# Patient Record
Sex: FEMALE | Race: BLACK OR AFRICAN AMERICAN | NOT HISPANIC OR LATINO | Employment: FULL TIME | URBAN - METROPOLITAN AREA
[De-identification: names, ages, dates, MRNs, and addresses within clinical notes are randomized per-mention and may not be internally consistent; named-entity substitution may affect disease eponyms.]

---

## 2023-12-21 ENCOUNTER — HOSPITAL ENCOUNTER (EMERGENCY)
Facility: OTHER | Age: 58
Discharge: HOME | End: 2023-12-21
Attending: EMERGENCY MEDICINE
Payer: COMMERCIAL

## 2023-12-21 VITALS
OXYGEN SATURATION: 97 % | WEIGHT: 155 LBS | HEART RATE: 89 BPM | BODY MASS INDEX: 31.25 KG/M2 | TEMPERATURE: 98 F | HEIGHT: 59 IN | DIASTOLIC BLOOD PRESSURE: 84 MMHG | SYSTOLIC BLOOD PRESSURE: 136 MMHG | RESPIRATION RATE: 18 BRPM

## 2023-12-21 DIAGNOSIS — S09.90XA INJURY OF HEAD, INITIAL ENCOUNTER: Primary | ICD-10-CM

## 2023-12-21 PROCEDURE — 99285 EMERGENCY DEPT VISIT HI MDM: CPT | Mod: 25

## 2023-12-21 PROCEDURE — 90715 TDAP VACCINE 7 YRS/> IM: CPT | Performed by: EMERGENCY MEDICINE

## 2023-12-21 PROCEDURE — 63600175 PHARM REV CODE 636 W HCPCS: Performed by: EMERGENCY MEDICINE

## 2023-12-21 PROCEDURE — 25000003 PHARM REV CODE 250: Performed by: EMERGENCY MEDICINE

## 2023-12-21 PROCEDURE — 90471 IMMUNIZATION ADMIN: CPT | Performed by: EMERGENCY MEDICINE

## 2023-12-21 RX ORDER — IBUPROFEN 400 MG/1
800 TABLET ORAL
Status: COMPLETED | OUTPATIENT
Start: 2023-12-21 | End: 2023-12-21

## 2023-12-21 RX ORDER — METHOCARBAMOL 500 MG/1
1000 TABLET, FILM COATED ORAL 3 TIMES DAILY PRN
Qty: 20 TABLET | Refills: 0 | Status: SHIPPED | OUTPATIENT
Start: 2023-12-21 | End: 2023-12-26

## 2023-12-21 RX ORDER — IBUPROFEN 600 MG/1
600 TABLET ORAL EVERY 6 HOURS PRN
Qty: 20 TABLET | Refills: 0 | Status: SHIPPED | OUTPATIENT
Start: 2023-12-21

## 2023-12-21 RX ADMIN — TETANUS TOXOID, REDUCED DIPHTHERIA TOXOID AND ACELLULAR PERTUSSIS VACCINE, ADSORBED 0.5 ML: 5; 2.5; 8; 8; 2.5 SUSPENSION INTRAMUSCULAR at 04:12

## 2023-12-21 RX ADMIN — IBUPROFEN 800 MG: 400 TABLET ORAL at 04:12

## 2023-12-21 NOTE — FIRST PROVIDER EVALUATION
" Emergency Department TeleTriage Encounter Note      CHIEF COMPLAINT    Chief Complaint   Patient presents with    Fall     Pt reports tripped while walking outside and fell head first. Reports passed out "for a second." Abrasion and knot noted to left forehead. Denies blood thinner use.        VITAL SIGNS   Initial Vitals [12/21/23 1520]   BP Pulse Resp Temp SpO2   139/84 92 18 97.9 °F (36.6 °C) 96 %      MAP       --            ALLERGIES    Review of patient's allergies indicates:  No Known Allergies    PROVIDER TRIAGE NOTE  Verbal consent for the teletriage evaluation was given by the patient at the start of the evaluation.  All efforts will be made to maintain patient's privacy during the evaluation.      This is a teletriage evaluation of a 58 y.o. female presenting to the ED with c/o trip and fell, hit head and face on concrete.  Possible LOC.  Abrasion noted above left eyebrow.  Denies neck pain. Limited physical exam via telehealth: The patient is awake, alert, answering questions appropriately and is not in respiratory distress.  As the Teletriage provider, I performed an initial assessment and ordered appropriate labs and imaging studies, if any, to facilitate the patient's care once placed in the ED. Once a room is available, care and a full evaluation will be completed by an alternate ED provider.  Any additional orders and the final disposition will be determined by that provider.  All imaging and labs will not be followed-up by the Teletriage Team, including myself.          ORDERS  Labs Reviewed - No data to display    ED Orders (720h ago, onward)      Start Ordered     Status Ordering Provider    12/21/23 1525 12/21/23 1525  CT Head Without Contrast  1 time imaging         Ordered ALBA LAWSON    12/21/23 1525 12/21/23 1525  CT Maxillofacial Without Contrast  1 time imaging         Ordered ALBA LAWSON              Virtual Visit Note: The provider triage portion of this emergency department " evaluation and documentation was performed via [employer], a HIPAA-compliant telemedicine application, in concert with a tele-presenter in the room. A face to face patient evaluation with one of my colleagues will occur once the patient is placed in an emergency department room.      DISCLAIMER: This note was prepared with [employer] voice recognition transcription software. Garbled syntax, mangled pronouns, and other bizarre constructions may be attributed to that software system.

## 2023-12-21 NOTE — ED PROVIDER NOTES
"Encounter Date: 12/21/2023       History     Chief Complaint   Patient presents with    Fall     Pt reports tripped while walking outside and fell head first. Reports passed out "for a second." Abrasion and knot noted to left forehead. Denies blood thinner use.      Seen by physician at 3:50PM:      Patient is a 58 female who presents to the emergency department after a fall that occurred approximately 1-2 hours ago.  She was walking outside when she tripped and fell forwards on the concrete.  She denies any LOC.  She did have a resultant head wound with bleeding.  She also has some resultant neck pain but denies any back pain.  Denies any chest pain or shortness breath.  Denies any dizziness before or after the fall.  She denies blood thinner use.  She does not know her last tetanus.      Review of patient's allergies indicates:  No Known Allergies  No past medical history on file.  No past surgical history on file.  No family history on file.     Review of Systems   Constitutional:  Negative for chills and fever.   HENT:  Negative for congestion and rhinorrhea.    Respiratory:  Negative for chest tightness and shortness of breath.    Cardiovascular:  Negative for chest pain and palpitations.   Gastrointestinal:  Negative for abdominal pain, diarrhea, nausea and vomiting.   Genitourinary:  Negative for dysuria and flank pain.   Musculoskeletal:  Positive for neck pain. Negative for back pain.   Skin:  Positive for wound. Negative for color change.   Neurological:  Negative for dizziness and headaches.       Physical Exam     Initial Vitals [12/21/23 1520]   BP Pulse Resp Temp SpO2   139/84 92 18 97.9 °F (36.6 °C) 96 %      MAP       --         Physical Exam    Nursing note and vitals reviewed.  Constitutional: She appears well-developed and well-nourished.   HENT:   Head: Normocephalic.    2 small abrasions with resultant hematoma to the left forehead   Eyes: Conjunctivae are normal.   Neck: Neck supple.   Normal " range of motion.  Cardiovascular:  Normal rate, regular rhythm and normal heart sounds.           Pulmonary/Chest: Breath sounds normal. No respiratory distress. She has no wheezes. She has no rales.   Abdominal: She exhibits no distension.   Musculoskeletal:         General: Normal range of motion.      Cervical back: Normal range of motion and neck supple.      Comments:  No CTL midline tenderness.     Neurological: She is alert and oriented to person, place, and time.     Ambulatory with steady gait   Skin: Skin is warm and dry. Capillary refill takes less than 2 seconds.         ED Course   Procedures  Labs Reviewed - No data to display       Imaging Results              CT Maxillofacial Without Contrast (Final result)  Result time 12/21/23 16:24:29      Final result by Suleiman Telles III, MD (12/21/23 16:24:29)                   Impression:      No acute process seen.      Electronically signed by: Suleiman Telles MD  Date:    12/21/2023  Time:    16:24               Narrative:    EXAMINATION:  CT MAXILLOFACIAL WITHOUT CONTRAST    CLINICAL HISTORY:  Facial trauma, blunt;    FINDINGS:  The mandible is intact.  Pterygoid plates are intact.  Zygomatic arches are unremarkable.  Nasal bones and nasal spines are intact.  The orbits are intact.  No soft tissue mass or adenopathy seen.  Mastoid air cells and middle ear cavities are clear.  No C-spine trauma seen.                                       CT Head Without Contrast (Final result)  Result time 12/21/23 16:23:19      Final result by Suleiman Telles III, MD (12/21/23 16:23:19)                   Impression:      No acute intracranial process seen.      Electronically signed by: Suleiman Telles MD  Date:    12/21/2023  Time:    16:23               Narrative:    EXAMINATION:  CT HEAD WITHOUT CONTRAST    CLINICAL HISTORY:  Head trauma, moderate-severe;Facial trauma, blunt;possible LOC;    FINDINGS:  No bleed, mass, or mass effect seen.  No trauma is seen.  No  blood products are seen.  No edema is seen.  No skull lesion or skull fracture seen.  There is soft tissue edema left temporal region.                                       Medications   Tdap (BOOSTRIX) vaccine injection 0.5 mL (0.5 mLs Intramuscular Given 12/21/23 1617)   ibuprofen tablet 800 mg (800 mg Oral Given 12/21/23 1616)     Medical Decision Making  3:50PM:     Patient is a 58-year-old female who presents to the emergency room after a fall.  She does have a superficial bleeding wound to the left forehead with a resultant hematoma.  She is otherwise neurologically intact. Will plan for imaging, irrigation, tetanus, will continue to follow and reassess.    Amount and/or Complexity of Data Reviewed  External Data Reviewed: notes.  Radiology: ordered. Decision-making details documented in ED Course.    Risk  Prescription drug management.    5:36 PM:    Patient's CT is negative for any acute findings.  Wound irrigated and I do not feel that any repair is indicated at this time.  Will plan for conservative management with wound care at home.Will plan to prescribe a course of NSAIDs and muscle relaxants to take as needed for pain.  I do not feel that further work up in the ED is indicated at this time.  I updated pt regarding results and I counseled pt regarding supportive care measures.  I have discussed with the pt ED return warnings and need for close PCP f/u.  Pt agreeable to plan and all questions answered.  I feel that pt is stable for discharge and management as an outpatient and no further intervention is needed at this time.  Pt is comfortable returning to the ED if needed.  Will DC home in stable condition.                                    Clinical Impression:  Final diagnoses:  [S09.90XA] Injury of head, initial encounter (Primary)          ED Disposition Condition    Discharge Stable          ED Prescriptions       Medication Sig Dispense Start Date End Date Auth. Provider    ibuprofen (ADVIL,MOTRIN)  600 MG tablet Take 1 tablet (600 mg total) by mouth every 6 (six) hours as needed for Pain. 20 tablet 12/21/2023 -- Sophia Lott MD    methocarbamoL (ROBAXIN) 500 MG Tab Take 2 tablets (1,000 mg total) by mouth 3 (three) times daily as needed. 20 tablet 12/21/2023 12/26/2023 Sophia Lott MD          Follow-up Information       Follow up With Specialties Details Why Contact Info    Primary care Physician                 Sophia Lott MD  12/21/23 [id number]       Sophia Lott MD  12/21/23 [id number]

## 2023-12-21 NOTE — ED TRIAGE NOTES
Tripped on uneven sidewalk just PTA - presents with c/o hematoma/abrasion to left forehead and pain to left knee. Able to bear weight. No distress noted.

## 2024-07-12 ENCOUNTER — HOSPITAL ENCOUNTER (EMERGENCY)
Facility: OTHER | Age: 59
Discharge: HOME | End: 2024-07-12
Attending: EMERGENCY MEDICINE
Payer: COMMERCIAL

## 2024-07-12 VITALS
RESPIRATION RATE: 15 BRPM | TEMPERATURE: 98 F | SYSTOLIC BLOOD PRESSURE: 117 MMHG | HEIGHT: 59 IN | BODY MASS INDEX: 31.31 KG/M2 | OXYGEN SATURATION: 95 % | DIASTOLIC BLOOD PRESSURE: 66 MMHG | HEART RATE: 75 BPM

## 2024-07-12 DIAGNOSIS — M54.50 ACUTE RIGHT-SIDED LOW BACK PAIN WITHOUT SCIATICA: ICD-10-CM

## 2024-07-12 DIAGNOSIS — R07.89 CHEST TIGHTNESS: ICD-10-CM

## 2024-07-12 DIAGNOSIS — Z87.19 HISTORY OF VENTRAL HERNIA REPAIR: ICD-10-CM

## 2024-07-12 DIAGNOSIS — R10.31 RIGHT LOWER QUADRANT ABDOMINAL PAIN: Primary | ICD-10-CM

## 2024-07-12 DIAGNOSIS — R07.9 CHEST PAIN: ICD-10-CM

## 2024-07-12 DIAGNOSIS — Z98.890 HISTORY OF VENTRAL HERNIA REPAIR: ICD-10-CM

## 2024-07-12 LAB
ALBUMIN SERPL BCP-MCNC: 3.3 G/DL (ref 3.5–5.2)
ALP SERPL-CCNC: 118 U/L (ref 55–135)
ALT SERPL W/O P-5'-P-CCNC: 14 U/L (ref 10–44)
ANION GAP SERPL CALC-SCNC: 7 MMOL/L (ref 8–16)
AST SERPL-CCNC: 14 U/L (ref 10–40)
BACTERIA #/AREA URNS HPF: NORMAL /HPF
BASOPHILS # BLD AUTO: 0.03 K/UL (ref 0–0.2)
BASOPHILS NFR BLD: 0.3 % (ref 0–1.9)
BILIRUB SERPL-MCNC: 0.7 MG/DL (ref 0.1–1)
BILIRUB UR QL STRIP: NEGATIVE
BUN SERPL-MCNC: 15 MG/DL (ref 8–23)
CALCIUM SERPL-MCNC: 9 MG/DL (ref 8.7–10.5)
CHLORIDE SERPL-SCNC: 108 MMOL/L (ref 95–110)
CLARITY UR: CLEAR
CO2 SERPL-SCNC: 26 MMOL/L (ref 23–29)
COLOR UR: YELLOW
CREAT SERPL-MCNC: 0.8 MG/DL (ref 0.5–1.4)
DIFFERENTIAL METHOD BLD: ABNORMAL
EOSINOPHIL # BLD AUTO: 0.1 K/UL (ref 0–0.5)
EOSINOPHIL NFR BLD: 1.3 % (ref 0–8)
ERYTHROCYTE [DISTWIDTH] IN BLOOD BY AUTOMATED COUNT: 15.2 % (ref 11.5–14.5)
EST. GFR  (NO RACE VARIABLE): >60 ML/MIN/1.73 M^2
GLUCOSE SERPL-MCNC: 92 MG/DL (ref 70–110)
GLUCOSE UR QL STRIP: NEGATIVE
HCT VFR BLD AUTO: 36.7 % (ref 37–48.5)
HGB BLD-MCNC: 11.7 G/DL (ref 12–16)
HGB UR QL STRIP: NEGATIVE
HYALINE CASTS #/AREA URNS LPF: 0 /LPF
IMM GRANULOCYTES # BLD AUTO: 0.02 K/UL (ref 0–0.04)
IMM GRANULOCYTES NFR BLD AUTO: 0.2 % (ref 0–0.5)
KETONES UR QL STRIP: NEGATIVE
LEUKOCYTE ESTERASE UR QL STRIP: NEGATIVE
LIPASE SERPL-CCNC: 7 U/L (ref 4–60)
LYMPHOCYTES # BLD AUTO: 1.5 K/UL (ref 1–4.8)
LYMPHOCYTES NFR BLD: 16.4 % (ref 18–48)
MCH RBC QN AUTO: 29 PG (ref 27–31)
MCHC RBC AUTO-ENTMCNC: 31.9 G/DL (ref 32–36)
MCV RBC AUTO: 91 FL (ref 82–98)
MICROSCOPIC COMMENT: NORMAL
MONOCYTES # BLD AUTO: 0.6 K/UL (ref 0.3–1)
MONOCYTES NFR BLD: 6.6 % (ref 4–15)
NEUTROPHILS # BLD AUTO: 6.8 K/UL (ref 1.8–7.7)
NEUTROPHILS NFR BLD: 75.2 % (ref 38–73)
NITRITE UR QL STRIP: NEGATIVE
NRBC BLD-RTO: 0 /100 WBC
OHS QRS DURATION: 70 MS
OHS QTC CALCULATION: 428 MS
PH UR STRIP: 8 [PH] (ref 5–8)
PLATELET # BLD AUTO: 228 K/UL (ref 150–450)
PMV BLD AUTO: 10.6 FL (ref 9.2–12.9)
POTASSIUM SERPL-SCNC: 4.1 MMOL/L (ref 3.5–5.1)
PROT SERPL-MCNC: 6.9 G/DL (ref 6–8.4)
PROT UR QL STRIP: ABNORMAL
RBC # BLD AUTO: 4.03 M/UL (ref 4–5.4)
RBC #/AREA URNS HPF: 3 /HPF (ref 0–4)
SODIUM SERPL-SCNC: 141 MMOL/L (ref 136–145)
SP GR UR STRIP: >1.03 (ref 1–1.03)
SQUAMOUS #/AREA URNS HPF: 2 /HPF
TROPONIN I SERPL DL<=0.01 NG/ML-MCNC: 0.01 NG/ML (ref 0–0.03)
URN SPEC COLLECT METH UR: ABNORMAL
UROBILINOGEN UR STRIP-ACNC: NEGATIVE EU/DL
WBC # BLD AUTO: 9 K/UL (ref 3.9–12.7)
WBC #/AREA URNS HPF: 1 /HPF (ref 0–5)

## 2024-07-12 PROCEDURE — 84484 ASSAY OF TROPONIN QUANT: CPT | Performed by: EMERGENCY MEDICINE

## 2024-07-12 PROCEDURE — 63600175 PHARM REV CODE 636 W HCPCS: Performed by: EMERGENCY MEDICINE

## 2024-07-12 PROCEDURE — 96376 TX/PRO/DX INJ SAME DRUG ADON: CPT

## 2024-07-12 PROCEDURE — 80053 COMPREHEN METABOLIC PANEL: CPT | Performed by: EMERGENCY MEDICINE

## 2024-07-12 PROCEDURE — 93005 ELECTROCARDIOGRAM TRACING: CPT

## 2024-07-12 PROCEDURE — 96374 THER/PROPH/DIAG INJ IV PUSH: CPT

## 2024-07-12 PROCEDURE — 85025 COMPLETE CBC W/AUTO DIFF WBC: CPT | Performed by: EMERGENCY MEDICINE

## 2024-07-12 PROCEDURE — 83690 ASSAY OF LIPASE: CPT | Performed by: EMERGENCY MEDICINE

## 2024-07-12 PROCEDURE — 96375 TX/PRO/DX INJ NEW DRUG ADDON: CPT

## 2024-07-12 PROCEDURE — 81000 URINALYSIS NONAUTO W/SCOPE: CPT | Performed by: EMERGENCY MEDICINE

## 2024-07-12 PROCEDURE — 25500020 PHARM REV CODE 255: Performed by: EMERGENCY MEDICINE

## 2024-07-12 PROCEDURE — 99285 EMERGENCY DEPT VISIT HI MDM: CPT | Mod: 25

## 2024-07-12 PROCEDURE — 93010 ELECTROCARDIOGRAM REPORT: CPT | Mod: ,,, | Performed by: INTERNAL MEDICINE

## 2024-07-12 RX ORDER — ONDANSETRON HYDROCHLORIDE 2 MG/ML
8 INJECTION, SOLUTION INTRAVENOUS ONCE AS NEEDED
Status: COMPLETED | OUTPATIENT
Start: 2024-07-12 | End: 2024-07-12

## 2024-07-12 RX ORDER — METOCLOPRAMIDE HYDROCHLORIDE 5 MG/ML
10 INJECTION INTRAMUSCULAR; INTRAVENOUS ONCE AS NEEDED
Status: COMPLETED | OUTPATIENT
Start: 2024-07-12 | End: 2024-07-12

## 2024-07-12 RX ORDER — TIZANIDINE 4 MG/1
4 TABLET ORAL EVERY 8 HOURS PRN
Qty: 15 TABLET | Refills: 0 | Status: SHIPPED | OUTPATIENT
Start: 2024-07-12 | End: 2024-07-22

## 2024-07-12 RX ORDER — OXYCODONE AND ACETAMINOPHEN 5; 325 MG/1; MG/1
1 TABLET ORAL EVERY 6 HOURS PRN
Qty: 12 TABLET | Refills: 0 | Status: SHIPPED | OUTPATIENT
Start: 2024-07-12

## 2024-07-12 RX ORDER — DROPERIDOL 2.5 MG/ML
0.62 INJECTION, SOLUTION INTRAMUSCULAR; INTRAVENOUS
Status: COMPLETED | OUTPATIENT
Start: 2024-07-12 | End: 2024-07-12

## 2024-07-12 RX ORDER — IBUPROFEN 600 MG/1
600 TABLET ORAL EVERY 8 HOURS PRN
Qty: 20 TABLET | Refills: 0 | Status: SHIPPED | OUTPATIENT
Start: 2024-07-12

## 2024-07-12 RX ORDER — KETOROLAC TROMETHAMINE 30 MG/ML
10 INJECTION, SOLUTION INTRAMUSCULAR; INTRAVENOUS
Status: COMPLETED | OUTPATIENT
Start: 2024-07-12 | End: 2024-07-12

## 2024-07-12 RX ORDER — HYDROMORPHONE HYDROCHLORIDE 1 MG/ML
0.5 INJECTION, SOLUTION INTRAMUSCULAR; INTRAVENOUS; SUBCUTANEOUS EVERY 30 MIN PRN
Status: DISCONTINUED | OUTPATIENT
Start: 2024-07-12 | End: 2024-07-12 | Stop reason: HOSPADM

## 2024-07-12 RX ADMIN — METOCLOPRAMIDE 10 MG: 5 INJECTION, SOLUTION INTRAMUSCULAR; INTRAVENOUS at 08:07

## 2024-07-12 RX ADMIN — KETOROLAC TROMETHAMINE 10 MG: 30 INJECTION, SOLUTION INTRAMUSCULAR; INTRAVENOUS at 09:07

## 2024-07-12 RX ADMIN — HYDROMORPHONE HYDROCHLORIDE 0.5 MG: 0.5 INJECTION, SOLUTION INTRAMUSCULAR; INTRAVENOUS; SUBCUTANEOUS at 06:07

## 2024-07-12 RX ADMIN — IOHEXOL 75 ML: 350 INJECTION, SOLUTION INTRAVENOUS at 06:07

## 2024-07-12 RX ADMIN — HYDROMORPHONE HYDROCHLORIDE 0.5 MG: 0.5 INJECTION, SOLUTION INTRAMUSCULAR; INTRAVENOUS; SUBCUTANEOUS at 08:07

## 2024-07-12 RX ADMIN — ONDANSETRON 8 MG: 2 INJECTION INTRAMUSCULAR; INTRAVENOUS at 06:07

## 2024-07-12 RX ADMIN — DROPERIDOL 0.62 MG: 2.5 INJECTION, SOLUTION INTRAMUSCULAR; INTRAVENOUS at 09:07

## 2024-07-12 NOTE — DISCHARGE INSTRUCTIONS
Take Zanaflex cautiously only as needed for muscle spasms, as it may cause sedation.  Do not take Percocet and Zanaflex at the same time due to concern for excessive sedation.

## 2024-07-12 NOTE — ED NOTES
Pt ambulated to the bathroom to and maintained a slow and steady gait. Pt instructed on how to provide a urine sample

## 2024-07-12 NOTE — ED PROVIDER NOTES
"  Source of History:  Medical record, patient      Chief complaint:  Per triage note: "Back Pain (Right signed back pain that radiates down the back of her leg./Rates pain 10/10 with difficult ambulation/Tylenol did not help)  "    HPI:    Patient presents severe right lower quadrant abdominal pain. Patient localizes the pain to her groin area and notes it radiates down to her knee as well as her back.  She states that she has had some intermittent pain in this area for the past year that she attributes to prior hernia surgery.  She states that she had acute severe pain tonight starting at approximately 2200 hours not improved with acetaminophen. Patient denies fever, dysuria, hematuria, or vaginal bleeding.     ROS:   See HPI for pertinent Review of Systems      Review of patient's allergies indicates:  No Known Allergies    PMH:  As per HPI and below:  History reviewed. No pertinent past medical history.    No past surgical history on file.         Physical Exam:      Nursing note and vitals reviewed.  /74 (BP Location: Left arm)   Pulse 80   Temp 98.3 °F (36.8 °C) (Oral)   Resp 18   Ht 4' 11" (1.499 m)   SpO2 97%   Breastfeeding No   BMI 31.31 kg/m²     Constitutional: No distress.  Eyes: EOMI. No discharge. Anicteric.  HENT:   Neck: Normal range of motion. Neck supple.  Cardiovascular: Normal rate. No murmur, no gallop and no friction rub heard.   Pulmonary/Chest: No respiratory distress. Effort normal. No wheezes, no rales, no rhonchi.   Abdominal:  Healed anterior abdominal surgical scar.  Bowel sounds normal. Soft. No distension and no mass. There is right lower quadrant tenderness. There is no rebound, no guarding  Neurological: GCS 15. Alert and oriented to person, place, and time. No gross cranial nerve, light touch or strength deficit. Coordination normal.   Skin: Skin is warm and dry.   EXT: 2+ radial pulses.  Right hip and knee range of motion exacerbate her pain.  Psych: Perseverative ("it " "starts here [points to RLQ] and it goes here" [motions across groin, to over hip joint, down lateral thigh towards knee])    Medical Decision Making / Independent Interpretations / External Records Reviewed:        ED Course as of 07/12/24 0640   Fri Jul 12, 2024   0542 Patient is a 59-year-old female with HIV, history of ventral hernia repair 2021 who presents for evaluation of right lower quadrant abdominal pain radiating to her right knee worse with hip flexion, knee movement, touching the area.  She denies any trauma, heavy lifting, or unusual activity.  She notes some nausea, denies any vomiting, or diarrhea.  She denies any associated fevers.  On exam, patient has diffuse tenderness over the right lower quadrant tenderness, with right hip and knee range of motion exacerbating her pain.  She has no obvious mass.  The initial differential included bowel obstruction, strangulated/incarcerated hernia.  Ruptured viscus is also on the differential.  Patient reports a history of appendectomy.  Musculoskeletal strain and spasm is on the differential but appears less likely. [RC]   0636   I was notified that patient just complained of new chest pain.   --  EKG Interpretation: normal sinus rhythm at 75 beats per minute, no STEMI, no significant acute ST/T abnormalities, normal intervals.  Prior tracing not immediately available.  --  I discussed the patient history, findings, plan with Dr. Allen, who assumes care.   [RC]      ED Course User Index  [RC] Brayden Villalpando MD       I decided to obtain the patient's medical records. I reviewed patient's prior external notes / results: specialist documentation   and inpatient admission documentation.     Additional Medical Decision Making: Hospitalization or escalation of care considered, Parenteral controlled substance ordered or considered, and Decision regarding advanced imaging    Medications   metoclopramide injection 10 mg (has no administration in time range) "   HYDROmorphone injection 0.5 mg (0.5 mg Intravenous Given 7/12/24 0607)   ondansetron injection 8 mg (8 mg Intravenous Given 7/12/24 0605)              Diagnostic Impression:    1. Right lower quadrant abdominal pain    2. History of ventral hernia repair    3. Chest tightness    4. Chest pain          No future appointments.        ---  I, Tiffani Guardado, scribed for, and in the presence of, Dr. Villalpando. I performed the scribed service and the documentation accurately describes the services I performed. I attest to the accuracy of the note.     Physician Attestation for Scribe:   I, Brayden Villalpando MD, reviewed documentation as scribed in my presence, which is both accurate and complete.       Brayden Villalpando MD  07/12/24 0601

## 2024-07-12 NOTE — PROVIDER PROGRESS NOTES - EMERGENCY DEPT.
Encounter Date: 7/12/2024    ED Physician Progress Notes        Physician Note:   59-year-old female with history of well-controlled HIV initially seen by Dr. Villalpando for right-sided back pain radiating to her right lower quadrant and leg.  She has had occasional intermittent episodes of this pain but it became severe last night, does do some lifting at her job but no definite injury.  On initial exam she did have some right lower quadrant tenderness, signed out to me pending labs and CT abdomen.  She also reports history of ventral hernia repair with some pain episodes since then.  Labs with no elevated WBC, no elevated LFTs/lipase, normal renal function.  Patient develops a brief episode of chest tightness while monitored in ED, with no ischemia on EKG and normal troponin as well.  CT abdomen with no acute process, she was s/p appendectomy with no sign of intra-abdominal infection or complication related to previous hernia repair.  On reassessment patient still had some pain after Dilaudid/antiemetics, so was given Toradol and low-dose droperidol with further improvement.  She only now has pain to her right lower back and flank with movement, no significant abdominal tenderness.  She remained afebrile while monitored in ED, UA with no evidence for UTI.  She feels comfortable with trial of outpatient management for suspected musculoskeletal etiology of her back pain with possible radiculopathy or spasm.  Will Rx ibuprofen, and also Percocet for severe pain and Zanaflex for any spasm component; patient extensively counseled on potential sedative affects of Percocet and Zanaflex and will take cautiously and not at the same time.  She will follow up with PCP to ensure improvement and return to the ED for any worsening or new concerning symptoms.

## 2024-07-12 NOTE — ED TRIAGE NOTES
Patient presents to the ED via private car with complaints of pain 10/10 that radiates from right mid abdominal area to right hip down below right knee. Reports pain started yesterday morning without pain relief from tylenol and motrin.

## 2024-07-12 NOTE — Clinical Note
"Doris"Mamadou Garcia was seen and treated in our emergency department on 7/12/2024.  She may return to work on 07/15/2024.       If you have any questions or concerns, please don't hesitate to call.      Rush Allen MD"